# Patient Record
Sex: FEMALE | Race: WHITE | HISPANIC OR LATINO | Employment: FULL TIME | URBAN - METROPOLITAN AREA
[De-identification: names, ages, dates, MRNs, and addresses within clinical notes are randomized per-mention and may not be internally consistent; named-entity substitution may affect disease eponyms.]

---

## 2023-01-10 ENCOUNTER — OFFICE VISIT (OUTPATIENT)
Dept: FAMILY MEDICINE CLINIC | Facility: CLINIC | Age: 23
End: 2023-01-10
Payer: COMMERCIAL

## 2023-01-10 ENCOUNTER — LAB (OUTPATIENT)
Dept: LAB | Facility: HOSPITAL | Age: 23
End: 2023-01-10
Payer: COMMERCIAL

## 2023-01-10 ENCOUNTER — PATIENT ROUNDING (BHMG ONLY) (OUTPATIENT)
Dept: FAMILY MEDICINE CLINIC | Facility: CLINIC | Age: 23
End: 2023-01-10
Payer: COMMERCIAL

## 2023-01-10 VITALS
BODY MASS INDEX: 20.73 KG/M2 | DIASTOLIC BLOOD PRESSURE: 70 MMHG | WEIGHT: 129 LBS | OXYGEN SATURATION: 99 % | HEIGHT: 66 IN | HEART RATE: 79 BPM | SYSTOLIC BLOOD PRESSURE: 112 MMHG

## 2023-01-10 DIAGNOSIS — R10.11 RUQ PAIN: ICD-10-CM

## 2023-01-10 DIAGNOSIS — R31.9 HEMATURIA, UNSPECIFIED TYPE: ICD-10-CM

## 2023-01-10 DIAGNOSIS — R19.7 DIARRHEA, UNSPECIFIED TYPE: ICD-10-CM

## 2023-01-10 DIAGNOSIS — R19.7 DIARRHEA, UNSPECIFIED TYPE: Primary | ICD-10-CM

## 2023-01-10 PROCEDURE — 80053 COMPREHEN METABOLIC PANEL: CPT

## 2023-01-10 PROCEDURE — 99204 OFFICE O/P NEW MOD 45 MIN: CPT | Performed by: FAMILY MEDICINE

## 2023-01-10 PROCEDURE — 85027 COMPLETE CBC AUTOMATED: CPT

## 2023-01-10 PROCEDURE — 82784 ASSAY IGA/IGD/IGG/IGM EACH: CPT

## 2023-01-10 PROCEDURE — 86231 EMA EACH IG CLASS: CPT

## 2023-01-10 PROCEDURE — 86364 TISS TRNSGLTMNASE EA IG CLAS: CPT

## 2023-01-10 NOTE — PROGRESS NOTES
Chief Complaint  New patient (Abdominal pain has been going on for a while now and getting worse, come on after eating /Pain close to kidney usually on right side) and Abdominal Pain    Subjective        Lorenza Mccloud presents to [facility]  Abdominal Pain  This is a new problem. The current episode started more than 1 year ago ( 2 years). The pain is located in the RUQ. The pain is mild. The quality of the pain is dull and aching. The abdominal pain radiates to the RUQ. Associated symptoms include belching, constipation and diarrhea. Pertinent negatives include no flatus, nausea or vomiting. The pain is aggravated by eating.     Knot in stomach. Keeps her up at night. BM once every 2 days. Firm stools not difficult to pass. Not lactose intolerant but if she eats something spicy she has diarrhea.   No heartburn but she has burping with spicy food. No FH of GI problems.      She did DNA testing and indicated prone to celiac disease. She eats pasta and things with gluten she has more of an effect.     She had UTI in October and had hematuria. Treated with medication. She has UTI 2 times in a year. She wonders about her kidneys. Bladder doesn't tell her she has to go but rather she gets ache on the right side and then she urinates. She has appt with gynecology.    She is tyring to increase fiber.       Objective   Vital Signs:  /70   Pulse 79   Ht 166.4 cm (65.5\")   Wt 58.5 kg (129 lb)   SpO2 99%   BMI 21.14 kg/m²   Estimated body mass index is 21.14 kg/m² as calculated from the following:    Height as of this encounter: 166.4 cm (65.5\").    Weight as of this encounter: 58.5 kg (129 lb).       BMI is within normal parameters. No other follow-up for BMI required.      Physical Exam  Vitals reviewed.   Constitutional:       General: She is not in acute distress.     Appearance: She is not ill-appearing.   Cardiovascular:      Rate and Rhythm: Normal rate and regular  Received call from patient who is requesting to speak to Samaria Shona. He states that he wants to get a chemical dependency assessment - wondering if Samaria can do that. He states he's getting too sick and can't keep doing this. Patient is having work done on his teeth and wants to wait until after that is done though. Please call. Phone #[phone]        Arcelia Ellison    [facility]      rhythm.   Pulmonary:      Effort: Pulmonary effort is normal.      Breath sounds: Normal breath sounds.   Abdominal:      General: Bowel sounds are normal. There is distension.      Palpations: There is no hepatomegaly or splenomegaly.      Tenderness: There is no abdominal tenderness. There is no guarding or rebound.   Genitourinary:     Comments: No CVA or suprapubic tenderness  Neurological:      Mental Status: She is alert.        Result Review :                   Assessment and Plan   Diagnoses and all orders for this visit:    1. Diarrhea, unspecified type (Primary)  -     Celiac Disease Panel; Future  -     CBC (No Diff); Future  -     US Abdomen Complete; Future    2. RUQ pain  -     Comprehensive Metabolic Panel; Future  -     US Abdomen Complete; Future    3. Hematuria, unspecified type  -     US Abdomen Complete; Future    Further evaluation of chronic abdominal pain and bowel changes with labs and ultrasound.  I provided her with nutrition counseling low FODMAP diet.  Along with her digestive problems she also has concerns about UTIs and bladder dysfunction.  Labs with renal function as well as imaging with ultrasound.    Follow-up once results are available.         Follow Up   Return if symptoms worsen or fail to improve.  Patient was given instructions and counseling regarding her condition or for health maintenance advice. Please see specific information pulled into the AVS if appropriate.     Electronically signed by Komal Sullivan MD, 01/10/23, 3:41 PM EST.

## 2023-01-11 LAB
ALBUMIN SERPL-MCNC: 4.9 G/DL (ref 3.5–5.2)
ALBUMIN/GLOB SERPL: 2.1 G/DL
ALP SERPL-CCNC: 66 U/L (ref 39–117)
ALT SERPL W P-5'-P-CCNC: 10 U/L (ref 1–33)
ANION GAP SERPL CALCULATED.3IONS-SCNC: 8.3 MMOL/L (ref 5–15)
AST SERPL-CCNC: 13 U/L (ref 1–32)
BILIRUB SERPL-MCNC: 0.5 MG/DL (ref 0–1.2)
BUN SERPL-MCNC: 9 MG/DL (ref 6–20)
BUN/CREAT SERPL: 14.5 (ref 7–25)
CALCIUM SPEC-SCNC: 9.7 MG/DL (ref 8.6–10.5)
CHLORIDE SERPL-SCNC: 103 MMOL/L (ref 98–107)
CO2 SERPL-SCNC: 28.7 MMOL/L (ref 22–29)
CREAT SERPL-MCNC: 0.62 MG/DL (ref 0.57–1)
DEPRECATED RDW RBC AUTO: 39.8 FL (ref 37–54)
EGFRCR SERPLBLD CKD-EPI 2021: 127.7 ML/MIN/1.73
ERYTHROCYTE [DISTWIDTH] IN BLOOD BY AUTOMATED COUNT: 12.1 % (ref 12.3–15.4)
GLOBULIN UR ELPH-MCNC: 2.3 GM/DL
GLUCOSE SERPL-MCNC: 99 MG/DL (ref 65–99)
HCT VFR BLD AUTO: 40.4 % (ref 34–46.6)
HGB BLD-MCNC: 13.6 G/DL (ref 12–15.9)
MCH RBC QN AUTO: 30 PG (ref 26.6–33)
MCHC RBC AUTO-ENTMCNC: 33.7 G/DL (ref 31.5–35.7)
MCV RBC AUTO: 89.2 FL (ref 79–97)
PLATELET # BLD AUTO: 343 10*3/MM3 (ref 140–450)
PMV BLD AUTO: 9.5 FL (ref 6–12)
POTASSIUM SERPL-SCNC: 3.9 MMOL/L (ref 3.5–5.2)
PROT SERPL-MCNC: 7.2 G/DL (ref 6–8.5)
RBC # BLD AUTO: 4.53 10*6/MM3 (ref 3.77–5.28)
SODIUM SERPL-SCNC: 140 MMOL/L (ref 136–145)
WBC NRBC COR # BLD: 6.78 10*3/MM3 (ref 3.4–10.8)

## 2023-01-12 LAB
ENDOMYSIUM IGA SER QL: NEGATIVE
IGA SERPL-MCNC: 156 MG/DL (ref 87–352)
TTG IGA SER-ACNC: <2 U/ML (ref 0–3)

## 2023-02-23 ENCOUNTER — OFFICE VISIT (OUTPATIENT)
Dept: FAMILY MEDICINE CLINIC | Facility: CLINIC | Age: 23
End: 2023-02-23
Payer: COMMERCIAL

## 2023-02-23 VITALS
WEIGHT: 128.2 LBS | OXYGEN SATURATION: 98 % | DIASTOLIC BLOOD PRESSURE: 62 MMHG | HEART RATE: 79 BPM | BODY MASS INDEX: 20.6 KG/M2 | HEIGHT: 66 IN | SYSTOLIC BLOOD PRESSURE: 110 MMHG

## 2023-02-23 DIAGNOSIS — Z12.4 SCREENING FOR CERVICAL CANCER: ICD-10-CM

## 2023-02-23 DIAGNOSIS — Z23 NEED FOR VACCINATION: ICD-10-CM

## 2023-02-23 DIAGNOSIS — Z00.00 WELL ADULT EXAM: Primary | ICD-10-CM

## 2023-02-23 PROCEDURE — 90471 IMMUNIZATION ADMIN: CPT | Performed by: FAMILY MEDICINE

## 2023-02-23 PROCEDURE — 99395 PREV VISIT EST AGE 18-39: CPT | Performed by: FAMILY MEDICINE

## 2023-02-23 PROCEDURE — 90715 TDAP VACCINE 7 YRS/> IM: CPT | Performed by: FAMILY MEDICINE

## 2023-02-23 RX ORDER — ESCITALOPRAM OXALATE 10 MG/1
1 TABLET ORAL DAILY
COMMUNITY
Start: 2023-02-22

## 2023-02-23 NOTE — PROGRESS NOTES
"Chief Complaint  Well adult exam (Started seeing Psych for anxiety  ) and Annual Exam    Subjective          Lorenza Mccloud presents to [facility] for   History of Present Illness  Answers for HPI/ROS submitted by the patient on 2/17/2023  Please describe your symptoms.: I don’t have any symptoms.  Have you had these symptoms before?: No  How long have you been having these symptoms?: 1-4 days  What is the primary reason for your visit?: Other    She is having cramps. Menses usually at the end of the month. Every 4 weeks. Heavy flow. Changes tampon every 2 hours on 2nd day of period, then lighter.  She feels tired. Back pain and mood changes as well. Using condoms. She is not planning on birth control at this time. No h/o abnormal pap smears.     She is no longer having stomach pain. She cut out bread and milk. She takes pre/probiotic. Increased fiber in diet.      She hasn't had COVID. She has had 3 COVID vaccines.         Review of Systems   Gastrointestinal: Negative for abdominal pain, constipation, diarrhea and GERD.   Genitourinary: Negative for dysuria, pelvic pain and vaginal discharge.   Musculoskeletal: Positive for back pain.         Objective   Vital Signs:   Vitals:    02/23/23 0813   BP: 110/62   Pulse: 79   SpO2: 98%   Weight: 58.2 kg (128 lb 3.2 oz)   Height: 166.4 cm (65.51\")     Body mass index is 21 kg/m².    BMI is within normal parameters. No other follow-up for BMI required.          Physical Exam  Vitals reviewed. Exam conducted with a chaperone present.   Constitutional:       General: She is not in acute distress.     Appearance: She is not ill-appearing.   HENT:      Right Ear: Tympanic membrane and ear canal normal.      Left Ear: Tympanic membrane and ear canal normal.   Eyes:      General:         Right eye: No discharge.         Left eye: No discharge.      Conjunctiva/sclera: Conjunctivae normal.   Neck:      Thyroid: No thyromegaly. "   Cardiovascular:      Rate and Rhythm: Normal rate and regular rhythm.   Pulmonary:      Effort: Pulmonary effort is normal. No respiratory distress.      Breath sounds: Normal breath sounds.   Chest:   Breasts:     Right: Normal. No mass, nipple discharge, skin change or tenderness.      Left: Normal. No mass, nipple discharge, skin change or tenderness.   Abdominal:      Palpations: Abdomen is soft. There is no hepatomegaly.      Tenderness: There is no abdominal tenderness.   Genitourinary:     General: Normal vulva.      Exam position: Lithotomy position.      Pubic Area: No rash.       Labia:         Right: No lesion.         Left: No lesion.       Urethra: No urethral lesion.      Vagina: Normal.      Cervix: Normal.      Uterus: Normal.       Adnexa:         Right: No mass or tenderness.          Left: No mass or tenderness.        Rectum: No external hemorrhoid.      Comments: Normal external genitalia    Musculoskeletal:      Cervical back: Neck supple.      Right lower leg: No edema.      Left lower leg: No edema.   Lymphadenopathy:      Head:      Right side of head: No submandibular, preauricular or posterior auricular adenopathy.      Left side of head: No submandibular, preauricular or posterior auricular adenopathy.      Cervical: No cervical adenopathy.      Right cervical: No superficial cervical adenopathy.     Left cervical: No superficial cervical adenopathy.      Upper Body:      Right upper body: No supraclavicular or axillary adenopathy.      Left upper body: No supraclavicular or axillary adenopathy.   Skin:     General: Skin is warm.      Findings: No rash.   Neurological:      Mental Status: She is alert and oriented to person, place, and time.      Gait: Gait normal.   Psychiatric:         Mood and Affect: Mood normal.         Behavior: Behavior normal.         Thought Content: Thought content normal.         Judgment: Judgment normal.        Result Review :   The following data was  reviewed by: Komal Sullivan MD on 02/23/2023:  Common labs    Common Labs 1/10/23 1/10/23    1516 1516   Glucose 99    BUN 9    Creatinine 0.62    Sodium 140    Potassium 3.9    Chloride 103    Calcium 9.7    Albumin 4.9    Total Bilirubin 0.5    Alkaline Phosphatase 66    AST (SGOT) 13    ALT (SGPT) 10    WBC  6.78   Hemoglobin  13.6   Hematocrit  40.4   Platelets  343                    Immunization History   Administered Date(s) Administered   • COVID-19 (PFIZER) PURPLE CAP 03/10/2021, 04/07/2021   • DTaP, Unspecified 09/20/2002   • HPV Quadrivalent 09/15/2009, 11/17/2009, 04/13/2010   • Hep B, Adolescent or Pediatric 09/07/2001   • IPV 09/20/2002   • MMR 09/20/2002   • Meningococcal Conjugate 11/17/2009   • Tdap 09/15/2009, 02/23/2023       Health Maintenance   Topic Date Due   • COVID-19 Vaccine (3 - Booster for Pfizer series) 06/02/2021   • INFLUENZA VACCINE  Never done   • HEPATITIS C SCREENING  Never done   • CHLAMYDIA SCREENING  Never done   • PAP SMEAR  Never done   • ANNUAL PHYSICAL  02/23/2024   • TDAP/TD VACCINES (3 - Td or Tdap) 02/23/2033   • HPV VACCINES  Completed   • Pneumococcal Vaccine 0-64  Aged Out            Assessment and Plan    Diagnoses and all orders for this visit:    1. Well adult exam (Primary)  She declined STI screening.  2. Screening for cervical cancer  -     LIQUID-BASED PAP SMEAR, [facility] ([facility]); Future  If normal plan to repeat again in 3 years  3. Need for vaccination  -     Tdap Vaccine Greater Than or Equal To 6yo IM  Recommend COVID bivalent booster as well.      Counseling/anticipatory guidance: Nutrition,  immunizations, screenings      Follow Up   Return in about 1 year (around 2/23/2024) for Physical.  Patient was given instructions and counseling regarding her condition or for health maintenance advice. Please see specific information pulled into the AVS if appropriate.      Electronically signed by Komal Sullivan MD, 02/23/23, 8:38 AM EST.

## 2023-02-27 LAB — REF LAB TEST METHOD: NORMAL

## 2023-10-11 ENCOUNTER — E-VISIT (OUTPATIENT)
Dept: FAMILY MEDICINE CLINIC | Facility: TELEHEALTH | Age: 23
End: 2023-10-11
Payer: COMMERCIAL

## 2023-10-11 PROCEDURE — BRIGHTMDVISIT: Performed by: NURSE PRACTITIONER

## 2023-10-12 RX ORDER — ESCITALOPRAM OXALATE 10 MG/1
10 TABLET ORAL DAILY
Qty: 90 TABLET | Refills: 0 | Status: SHIPPED | OUTPATIENT
Start: 2023-10-12

## 2023-10-12 NOTE — E-VISIT TREATED
Chief Complaint: Anxiety, Depression, Stress   Patient introduction   Patient is 23-year-old female presenting with mood symptoms. Patient has had current symptoms for more than a year. Has had recent unusual stress relating to personal relationships, home situation, family functioning, work, and finances.   Patient-submitted comments explaining reason for visit: I am wanting to be prescribed escitalopram again due to my anxiety. I had to stop seeing my provider due to losing insurance coverage under my parent. Now that I have insurance again I am just needing medication without seeing a psychiatrist. Lexapro worked for my anxiety and I would like to restart.   Patient is willing to try medication as part of their treatment plan.   Patient did not request an excuse note.   Depression screening   PHQ-9. Response options are: Not at all (0), On several days (1), More than half the days (2), or Nearly every day (3).   Over the past 2 weeks, patient has been bothered:    (1) On several days by having little interest or pleasure in doing things    (1) On several days by depressed mood    (1) On several days by sleep disturbance    (1) On several days by fatigue or lethargy    (1) On several days by change in appetite    (1) On several days by feelings of worthlessness or excessive guilt    (0) Not at all by poor concentration    (1) On several days by observable restlessness or slowness in movement    (0) Not at all by thoughts of hurting themselves or that they would be better off dead   The above problems have made it very difficult to work, function at home, or get along with other people.   Score: 7. Interpretation: 0 to 4: None to minimal. 5 to 9: Mild depression. 10 to 14: Major Depressive Disorder, Mild. 15 to 19: Major Depressive Disorder, Moderately Severe. 20 to 27: Major Depressive Disorder, Severe.   Anxiety screening   GILMA-7. Response options are: Not at all (0), On several days (1), More than half the  days (2), or Nearly every day (3)   Over the past 2 weeks, patient has been bothered:    (3) Nearly every day by feeling nervous, anxious, or on edge    (2) On more than half the days by not being able to stop or control worrying    (2) On more than half the days by worrying too much about different things    (1) On several days by having trouble relaxing    (1) On several days by being so restless that it is hard to sit still    (3) Nearly every day by becoming easily annoyed or irritable    (3) Nearly every day by feeling afraid, as if something awful might happen   The above problems have made it very difficult to work, function at home, or get along with other people.   Score: 15. Interpretation: 0 to 4: None to minimal. 5 to 9: Mild anxiety. 10 to 14: Moderate anxiety. 15 to 21: Severe anxiety.   Suicide risk screening   Score: Negative screen (based on PHQ-9 responses above).   Action taken based on risk:    Negative screen: Patient completed interview.    Low risk: Patient completed interview. Follow-up per provider discretion.    Moderate risk: Recommended to call [phone] or [phone] or to go to their nearest ER. Patient given option to continue with the interview if those options are not relevant at this time. Follow-up per provider discretion.    High risk: Interview terminated. Recommended to go to ER or to call [phone] or [phone].   Repetitive thoughts and behaviors screening   DSM-5 Level 1 Cross-Cutting Symptom Measure, Section X. 2 items. Response options are: Not at all (0), Rarely (1), Several days (2), More than half the days (3), or Nearly every day (4)   Over the past 2 weeks, patient has been bothered:    (1) On 1 to 2 days by unpleasant thoughts, urges, or images that repeatedly enter their mind    (0) Not at all by feeling driven to repeat certain behaviors or mental acts   Score: 1. Interpretation: 0 to 2 (with 0 to 1 on both items): Negative screen. 2 or higher (with 2 or higher on either item): Positive  screen.   Jen/hypomania screening   DSM-5 Level 1 Cross-Cutting Symptom Measure, Section III. 2 items. Response options are: Not at all (0), Rarely (1), Several days (2), More than half the days (3), or Nearly every day (4)   Over the past 2 weeks, patient has been bothered:    (2) On several days by sleeping less than usual, but still having a lot of energy    (2) On several days by starting lots more projects than usual or doing more risky things than usual   Score: 4. Interpretation: 0 to 2 (with 1 on both items): Negative screen. 2 or higher (with 2 or higher on at least 1 item): Positive screen; in-interview follow-up with Marysol Self-Rating Jen (ASRM) Scale.   Mary[place] Self-Rating Jen (ASRM) Scale. 5 items in which patient chooses the statement that best describes how they have been feeling over the past week.   Patient responses:    (1) I occasionally feel happier or more cheerful than usual    (1) I occasionally feel more self-confident than usual    (0) I do not need less sleep than usual    (1) I occasionally talk more than usual    (1) I have occasionally been more active than usual   Score: 4. Interpretation: A score of 6 or higher indicates a high probability of a manic or hypomanic condition and may indicate a need for treatment and/or further diagnostic workup. A score of 5 or lower is less likely to be associated with significant symptoms of jen.   Psychosis/hallucination screening   DSM-5 Level 1 Cross-Cutting Symptom Measure, Section VII. 2 items. Response options are: Not at all (0), Rarely (1), Several days (2), More than half the days (3), or Nearly every day (4)   Over the past 2 weeks, patient has been bothered:    (0) Not at all by hearing things other people could not hear    (0) Not at all by feeling that someone could hear their thoughts   Score: 0. Interpretation: 0: Negative screen. 1 or higher: Positive screen.   Substance abuse screening   DSM-5 Level 1 Cross-Cutting Symptom  Measure, Section XIII. 2 items on use of tobacco, recreational drugs, or prescription medications beyond the amount prescribed or duration of prescription.   Over the past 2 weeks, patient:    (0) Did not use tobacco    (0) Did not use a recreational or prescription drug on their own   Score: 0. Interpretation: 0 is a negative screen. 1 or higher with positive response for prescription/recreational drug abuse leads to follow-up with Level 2 Cross-Cutting Symptom Measure, Section XIII. 1 or higher with positive response for tobacco use leads to tobacco cessation advice in AVS.   AUDIT-C. 3 items, shown if alcohol use reported above.   During the past year, patient:    (2) Had an alcoholic drink 2 to 4 times per month    (0) Had 1 to 2 alcoholic drinks on a typical day when they were drinking    (0) Did not have 6 or more drinks on one occasion   Score: 2. Interpretation: A score of 3 or higher in women is a positive screen for unhealthy drinking.   Comorbid/Exacerbating conditions   No history of asthma, cancer, chronic pain, congestive heart failure, coronary artery disease, diabetes, epilepsy, hypertension, inflammatory arthritis, kidney disease or history of kindey function problems, lupus, multiple sclerosis, Parkinson disease, thyroid disorder, or viral hepatitis.   Past mental health history   Previous diagnosis of depression and generalized anxiety disorder. Regarding month and year of first depression diagnosis, patient writes: 02/01/2023. Since initial depression diagnosis, patient has had a period when symptoms resolved and they did not need medication.   Family history of mental health disorders   No known family history of depression, generalized anxiety disorder, panic attacks, PTSD, OCD, bipolar disorder, schizophrenia/schizoaffective disorder, substance use disorder, or suicide/suicide attempt.   Current mental health treatment   Patient is not currently taking medication for any mental health  condition. Patient is not currently in counseling or therapy.   Previous mental health treatment   Patient has been seen by a psychiatrist in the past 2 years.   Has taken escitalopram and sertraline in the past.   As to effectiveness of past treatment:   Patient was satisfied with escitalopram. Patient wants to refill escitalopram.   Patient was not satisfied with sertraline. They felt it caused bothersome side effects. They had sexual side effects and weight gain. Patient does not want to refill sertraline.   Vital signs    Height: 157 centimeters    Weight: 58.9 kilograms   Current medications   Not taking other medications or supplements.   Medication allergies   None.   Medication contraindications   None.   Assessment   Mild depression.   This diagnosis is based on review of patient interview responses and other available clinical information.    PHQ-9 depression screening score: 7. Interpretation: 5 to 9: Mild depression.    GILMA-7 generalized anxiety screening score: 15. Interpretation: 15 to 21: Severe anxiety.   Suicide risk severity screening was negative.   Plan   Medications:   No medications prescribed.   Orders:    Referral to behavioral health. Additional note: Urgent Refer to [facility] for [facility] for medication management   Education:    Condition and causes    Treatment and self-care    Possible medication side effects    When to call provider   ----------   Electronically signed by EMELINA Falk on 2023-10-11 at 22:55PM   ----------   Patient Interview Transcript:   Have you ever been diagnosed with any of these mental health conditions? Select all that apply.    Depression    Generalized anxiety disorder (GILMA)   Not selected:    Panic attacks    Post traumatic stress disorder (PTSD)    Obsessive-compulsive disorder (OCD)    Bipolar disorder    Schizophrenia or schizoaffective disorder    A mental health condition not listed here (specify)    None of the above    When were you first diagnosed with depression? Please specify the month and year, or your best estimate, as MM/YYYY.    02/01/2023   Since you were first diagnosed with depression, has there been a time when your symptoms went away completely and you didn't need to take medication? Select one.    Yes   Not selected:    No   Are you currently taking medication for any mental health condition? Select one.    No   Not selected:    Yes   Have you taken medication for any mental health condition in the past? Select one.    Yes   Not selected:    No   Which medications have you taken in the past for your mental health condition(s)? Select all that apply.    Lexapro (escitalopram)    Zoloft (sertraline)   Not selected:    Atarax or Vistaril (hydroxyzine)    BuSpar (buspirone)    Celexa (citalopram)    Cymbalta or Drizalma Sprinkle (duloxetine)    Effexor or Effexor XR (venlafaxine)    Paxil, Paxil CR, or Pexeva (paroxetine)    Pristiq (desvenlafaxine)    Prozac (fluoxetine)    Remeron (mirtazapine)    Trazodone    Wellbutrin SR, Wellbutrin XL, Forfivo XL, or Aplenzin (bupropion)    Other (specify medication and whether you were satisfied with it)   I'm satisfied with Zoloft (sertraline)    No   Not selected:    Yes   I want to refill/restart    No   Not selected:    Yes   Why were you unsatisfied with Zoloft (sertraline)? Select all that apply.    I don't like the side effects   Not selected:    It doesn't help my symptoms at all    It helps some, but I still have bothersome symptoms    It's too expensive    None of the above   Have you had any of these side effects when taking Zoloft (sertraline)? Select all that apply.    Sexual side effects    Weight gain   Not selected:    Drowsiness    Trouble sleeping    Headache    Anxiety    Dizziness    Diarrhea    None of the above   I'm satisfied with Lexapro (escitalopram)    Yes   Not selected:    No   I want to refill/restart    Yes   Not selected:    No   Have you recently  experienced unusual stress from any of these? Select all that apply.    Personal relationships    Home situation    Family    Work    Finances   Not selected:    Something related to COVID-19    Current news and events    None of the above   Are you currently in counseling or therapy? Select one.    No   Not selected:    Yes   Are you currently being seen by a psychiatrist, or have you been seen by a psychiatrist in the last 2 years? Select one.    Yes, within the last 2 years   Not selected:    Yes, currently    No   Do any of these apply to you? Select all that apply.    None of the above   Not selected:    I'm pregnant    I've given birth in the past 12 months    I'm breastfeeding   Do you have any of these medical conditions? Scroll to see all options. Select all that apply.    None of the above   Not selected:    Asthma    Cancer    Chronic pain    Congestive heart failure    Coronary artery disease (blocked arteries in the heart)    Diabetes    Epilepsy    High blood pressure    Inflammatory arthritis    Kidney disease or history of kidney function problems    Lupus (SLE)    Multiple sclerosis    Parkinson disease    Thyroid disorder    Viral hepatitis   Do any of these apply to your first-degree blood relatives? First-degree blood relatives include parents, siblings, and children who you're related to by birth, not by marriage or adoption. Select all that apply.    No, not that I know of   Not selected:    Depression    Generalized anxiety disorder (GILMA)    Panic attacks    Post traumatic stress disorder (PTSD)    Obsessive-compulsive disorder (OCD)    Bipolar disorder    Schizophrenia or schizoaffective disorder    Drug or alcohol addiction (substance use disorder)     by suicide    Attempted suicide   1. Over the past 2 weeks, how often have you been bothered by: Having little interest or pleasure in doing things Select one.    Several days   Not selected:    Not at all    More than half the days     Nearly every day   2. Over the past 2 weeks, how often have you been bothered by: Feeling down, depressed, or hopeless Select one.    Several days   Not selected:    Not at all    More than half the days    Nearly every day   3. Over the past 2 weeks, how often have you been bothered by: Trouble falling or staying asleep, or sleeping too much Select one.    Several days   Not selected:    Not at all    More than half the days    Nearly every day   4. Over the past 2 weeks, how often have you been bothered by: Feeling tired or having little energy Select one.    Several days   Not selected:    Not at all    More than half the days    Nearly every day   5. Over the past 2 weeks, how often have you been bothered by: Poor appetite or overeating Select one.    Several days   Not selected:    Not at all    More than half the days    Nearly every day   6. Over the past 2 weeks, how often have you been bothered by: Feeling bad about yourself, that you're a failure, or that you've let yourself or friends and family down Select one.    Several days   Not selected:    Not at all    More than half the days    Nearly every day   7. Over the past 2 weeks, how often have you been bothered by: Trouble concentrating on things like watching TV or reading the news Select one.    Not at all   Not selected:    Several days    More than half the days    Nearly every day   8. Over the past 2 weeks, how often have you been bothered by: Moving or speaking so slowly that other people could have noticed OR Being so fidgety or restless that you have been moving around a lot more than usual Select one.    Several days   Not selected:    Not at all    More than half the days    Nearly every day   9. Over the past 2 weeks, how often have you been bothered by: Thoughts that you'd be better off dead or thoughts of hurting yourself Select one.    Not at all   Not selected:    Several days    More than half the days    Nearly every day   How  difficult have these problems made it for you to work, take care of things at home, or get along with other people? Select one.    Very difficult   Not selected:    Not difficult at all    Somewhat difficult    Extremely difficult   1. Over the past 2 weeks, how often have you been bothered by: Feeling nervous, anxious, or on edge? Select one.    Nearly every day   Not selected:    Not at all    Several days    More than half the days   2. Over the past 2 weeks, how often have you been bothered by: Not being able to stop or control worrying? Select one.    More than half the days   Not selected:    Not at all    Several days    Nearly every day   3. Over the past 2 weeks, how often have you been bothered by: Worrying too much about different things? Select one.    More than half the days   Not selected:    Not at all    Several days    Nearly every day   4. Over the past 2 weeks, how often have you been bothered by: Having trouble relaxing? Select one.    Several days   Not selected:    Not at all    More than half the days    Nearly every day   5. Over the past 2 weeks, how often have you been bothered by: Being so restless that it's hard to sit still? Select one.    Several days   Not selected:    Not at all    More than half the days    Nearly every day   6. Over the past 2 weeks, how often have you been bothered by: Becoming easily annoyed or irritable? Select one.    Nearly every day   Not selected:    Not at all    Several days    More than half the days   7. Over the past 2 weeks, how often have you been bothered by: Feeling afraid, as if something awful might happen? Select one.    Nearly every day   Not selected:    Not at all    Several days    More than half the days   How difficult have these symptoms made it for you to do your work, take care of things at home, or get along with other people? Select one.    Very difficult   Not selected:    Not difficult at all    Somewhat difficult    Extremely  "difficult   Over the past 2 weeks, how often have you been bothered by: Sleeping less than usual, but still having a lot of energy? Select one.    Several days   Not selected:    Not at all    1 to 2 days    More than half the days    Nearly every day   Over the past 2 weeks, how often have you been bothered by: Starting lots more projects than usual or doing more risky things than usual? Select one.    Several days   Not selected:    Not at all    1 to 2 days    More than half the days    Nearly every day   Which statement best describes how you've been feeling over the past week? \"Occasionally\" here means once or twice, and \"often\" means several times or more. Select one.    I occasionally feel happier or more cheerful than usual   Not selected:    I don't feel happier or more cheerful than usual    I often feel happier or more cheerful than usual    I feel happier or more cheerful than usual most of the time    I feel happier or more cheerful than usual all of the time   Which statement best describes how you've been feeling over the past week? \"Occasionally\" here means once or twice, and \"often\" means several times or more. Select one.    I occasionally feel more self-confident than usual   Not selected:    I don't feel more self-confident than usual    I often feel more self-confident than usual    I feel more self-confident than usual most of the time    I feel extremely self-confident all of the time   Which statement best describes how you've been feeling over the past week? \"Occasionally\" here means once or twice, and \"often\" means several times or more. Select one.    I don't need less sleep than usual   Not selected:    I occasionally need less sleep than usual    I often need less sleep than usual    I need less sleep than usual most of the time    I can go all day and all night without any sleep and still not feel tired   Which statement best describes how you've been feeling over the past week? " "\"Occasionally\" here means once or twice, and \"often\" means several times or more. Select one.    I occasionally talk more than usual   Not selected:    I don't talk more than usual    I often talk more than usual    I talk more than usual most of the time    I talk constantly and can't be interrupted   Which statement best describes how you've been feeling over the past week? \"Occasionally\" here means once or twice, and \"often\" means several times or more. By \"active,\" we mean socially, sexually, or at work, home, or school. Select one.    I have occasionally been more active than usual   Not selected:    I haven't been more active than usual    I have often been more active than usual    I have been more active than usual most of the time    I am constantly active or on the go all the time   Over the past 2 weeks, how often have you been bothered by: Hearing things other people couldn't hear, such as voices even when no one was around? Select one.    Not at all   Not selected:    1 to 2 days    Several days    More than half the days    Nearly every day   Over the past 2 weeks, how often have you been bothered by: Feeling that someone could hear your thoughts, or that you could hear what another person was thinking? Select one.    Not at all   Not selected:    1 to 2 days    Several days    More than half the days    Nearly every day   Over the past 2 weeks, how often have you been bothered by: Unpleasant thoughts, urges, or images that repeatedly enter your mind? Select one.    1 to 2 days   Not selected:    Not at all    Several days    More than half the days    Nearly every day   Over the past 2 weeks, how often have you been bothered by: Feeling driven to perform certain behaviors or mental acts over and over again? Select one.    Not at all   Not selected:    1 to 2 days    Several days    More than half the days    Nearly every day   In the past year, how often did you have a drink containing alcohol? Select " "one.    2 to 4 times per month   Not selected:    Never    Monthly or less    2 to 3 times per week    4 or more times per week   In the past year, how many drinks did you have on a typical day when you were drinking? Select one.    1 or 2   Not selected:    3 or 4    5 or 6    7 to 9    10 or more   In the past year, how often did you have 6 or more drinks on one occasion? Select one.    Never   Not selected:    Less than monthly    Monthly    Weekly    Daily or almost daily   Over the past 2 weeks, how often have you: Smoked any cigarettes, smoked a cigar or pipe, or used snuff or chewing tobacco? Select one.    Not at all   Not selected:    1 to 2 days    Several days    More than half the days    Nearly every day   Over the past 2 weeks, how often did you use any of these on your own? \"On your own\" means without a doctor's prescription, or more than prescribed, or longer than prescribed. - Prescription painkillers, such as Vicodin - Stimulants, such as Ritalin or Adderall - Sedatives or tranquilizers, such as sleeping pills or Valium - Marijuana - Cocaine or crack - Club drugs, such as Ecstasy - Hallucinogens, such as LSD - Heroin - Inhalants or solvents, such as glue - Methamphetamines, such as speed Select one.    Not at all   Not selected:    1 to 2 days    Several days    More than half the days    Nearly every day   Think about all of the symptoms you've shared with us today. How long have you been feeling this way? Select one.    More than a year   Not selected:    Less than a year    I'm not sure   These last few questions help us make sure your treatment plan is safe for you. Do you have any of these conditions? Select all that apply.    None of these   Not selected:    Uncorrected or persistent electrolyte abnormalities, such as potassium, sodium, calcium or magnesium    QT prolongation    Congenital long QT syndrome (LQTS)    Ventricular arrhythmias, such as ventricular fibrillation or ventricular " "tachycardia    Bradycardia (low heart rate)    Recent heart attack    Congestive heart failure (CHF)   Do any of these apply to you now or in the recent past? \"Cold turkey\" here means stopping a medication suddenly rather than slowly taking lower and lower doses until you're off the medication. Select all that apply.    None of these   Not selected:    Seizure disorder    Bulimia or anorexia    Liver disease    Alcohol abuse    Stopped using alcohol \"cold turkey\"    Stopped using a sedative \"cold turkey\"    Stopped using an anti-seizure drug \"cold turkey\"    Stopped using a benzodiazepine drug (Klonopin, Valium, Ativan, Xanax) \"cold turkey\"   Do any of the following apply to you? Select all that apply.    None of these   Not selected:    I'm currently taking pimozide    I'm currently taking thioridazine    I've taken an MAO inhibitor in the last 14 days    I've taken linezolid or IV methylene blue in the last 14 days   Are you taking any other medications, vitamins, or supplements? Select one.    No   Not selected:    Yes   Have you ever had an allergic or bad reaction to any medication? Select one.    No   Not selected:    Yes   If medication is recommended as part of your treatment plan, is that something you're willing to try? Select one.    Yes   Not selected:    No   Knowing your Body Mass Index (BMI) can help your provider choose the best medication for you. To determine your BMI, we need to know your height and weight. Enter your height.    Height   Enter your weight (in pounds).    Weight   Do you need a doctor's note? A doctor's note confirms that you received care today and states when you can return to school or work. It does not contain information about your diagnosis or treatment plan. Your provider will make the final decision on whether to give you a doctor's note. Doctor's notes CANNOT be backdated. Select one.    No   Not selected:    Today only (1 day)    Today and tomorrow (2 days)    3 days   " What is the main reason you're taking this interview today?    I am wanting to be prescribed escitalopram again due to my anxiety. I had to stop seeing my provider due to losing insurance coverage under my parent. Now that I have insurance again I am just needing medication without seeing a psychiatrist. Lexapro worked for my anxiety and I would like to restart   ----------   Medical history   Medical history data does not currently exist for this patient.

## 2023-10-12 NOTE — EXTERNAL PATIENT INSTRUCTIONS
Note   ER for any suicidal or homicidal ideation   Diagnosis   Depression   My name is EMELINA Falk. I'm a healthcare provider at [facility]. I've reviewed your interview, and I see that you have some common symptoms of depression. I'm glad you reached out.   Depression is the most common mental health condition worldwide. In the [place], about 1 in 5 people will experience clinical depression in their lifetime. Fortunately, effective treatments are available. The sooner you start treatment, the better it works.   Treatment for depression can include counseling, coaching, consultations, antidepressant medications, or various digital tools. In creating your treatment plan, I've considered your symptoms, current situation, medical history, and previous treatments, if any.    Please follow up with your provider in a few weeks. They'll check how you're doing and adjust your treatment plan if necessary.   In addition to your prescribed treatment, there are things you can do to help yourself feel better. Depression can lead you to avoid doing tasks, activities, and being with others. Taking action can help break the cycle of avoidance. Even small actions and lifestyle changes can make a big difference. Try some of the suggestions in the Other treatment section below.   Orders and referrals   I've included a referral for therapy in your treatment plan. Someone will contact you to schedule an appointment for counseling or therapy.   About your diagnosis   Depression is different from ordinary sadness. When you're sad or going through normal grief, the feelings may come and go, and then fade over time. Depression causes long-standing symptoms that affect your ability to go about your daily life.   It's a health condition that affects how you think and function, and can even affect your self-esteem. Sometimes depression can also cause physical symptoms such as headaches and stomach pains.   Common symptoms  of depression include:    Feeling sad, hopeless, discouraged, or down    Loss of interest or pleasure in previously enjoyable activities    Appetite or weight changes    Sleep disturbances: sleeping too much or too little    Either restlessness or sluggishness    Loss of energy    Excessive guilt    Feelings of worthlessness    Difficulty concentrating    Recurrent thoughts of death or suicide   What to expect   Counseling and talk therapy   Counseling or therapy teaches you new coping skills and more adaptive ways of thinking about problems. These tools can help you make positive changes. The benefits of counseling often last long after treatment sessions have stopped.   Many people with mild symptoms of depression don't need medication, and can be treated with counseling, coaching, or other types of care management.   When to seek care   If you feel like harming yourself or others, call 911 right away.   The [place] [facility] Crisis Lifeline is also available. You can call 988   to speak with a counselor at the lifeline, or you can connect with one using their online chat  .   Call us at [phone]   with any sudden or unexpected symptoms.    Worsening depression symptoms    New or worsening anxiety symptoms    Feeling extremely agitated or restless    Panic attacks    Worsening insomnia    New or worsening irritability    Inappropriate aggression, anger, or violence    Dangerous impulses    Extreme increase in activity or talking    Other unusual changes in behavior, mood, thoughts, or feeling   Other treatment   The tips below may help you feel better while you start your treatment plan:   Self-care    Depression can make self-care hard, but taking action can help you get better. So start where you are and set small goals. These can be simple: get out of bed, take a shower, get dressed, prepare a meal.    Make a list of activities that usually improve your mood. When you're feeling down, try doing one of  "those activities, even for a few minutes.    Be kind to yourself. Don't get down on yourself if you don't reach a goal. Be willing to try again.    Try to eat on a regular schedule. Blood sugar levels can affect mood.   Exercise    Physical exercise has an especially positive effect on mood. If you're able to, try walking 30 minutes a day, 3 to 5 times a week. If that sounds like too much, challenge yourself to start walking for just 10 minutes a day. If walking is not for you, find another activity. Any kind of physical activity helps. The best exercise is the kind you enjoy and will actually do.   Improve your sleep   Getting better sleep is one of the best things you can do to improve your symptoms.    Caffeine, tobacco, and alcohol can cause interrupted sleep. Cutting down or quitting these can improve the quality of your sleep. If you can't quit caffeine completely, try avoiding it later in the day.    Set a regular bedtime, and allow a period of time to \"unwind\" before going to sleep.    Wake up at the same time every day.    Turn off or put away all electronic devices an hour before going to sleep.    Avoid reading, watching TV, or using electronic devices in bed.    As much as possible, keep your bedroom dark, cool, and quiet.    If you're struggling to sleep, don't stay in bed. Get up and go to a quiet spot. Read or do relaxation exercises. Then go back to bed and try again.   Try mindfulness exercises    If your mind races, focus on your body instead. Breathe in slowly through your nose and out through your mouth.    Some people find that meditation helps with mood symptoms. If you want to try meditation but don't know how, mobile apps can get you started.   Use your creativity    When you have depression, you can often spend too much time thinking. Making something with your hands can use your thoughts in a positive way and bring some relief. It also helps you move from inaction to action. Activities like " writing in a journal, gardening, woodworking, cooking, or doing a craft can help focus your mind.   Connect with others    If you can't meet in person, send a short text or email to someone just to keep in touch.    If you use social media, notice how it makes you feel. If certain topics or people have a negative effect on your mood, unfollow them. Limit the time you spend on social media. Active participation can be better than passive scrolling through a feed.    If you're up to it, try volunteering. Or just do something kind for someone. This can lift your mood as well as theirs.   Your provider   Your diagnosis was provided by EMELINA Falk, a member of your trusted care team at [facility].   If you have any questions, call us at [phone]  .

## 2023-10-12 NOTE — TELEPHONE ENCOUNTER
Rx Refill Note  Requested Prescriptions     Pending Prescriptions Disp Refills    escitalopram (LEXAPRO) 10 MG tablet       Sig: Take 1 tablet by mouth Daily.      Last office visit with prescribing clinician: 2/23/2023   Last telemedicine visit with prescribing clinician: Visit date not found   Next office visit with prescribing clinician: Visit date not found                         Would you like a call back once the refill request has been completed: [] Yes [] No    If the office needs to give you a call back, can they leave a voicemail: [] Yes [] No    Eddie Campos MA  10/12/23, 09:13 EDT      Return in about 1 year (around 2/23/2024) for Physical.

## 2024-01-26 RX ORDER — ESCITALOPRAM OXALATE 10 MG/1
10 TABLET ORAL DAILY
Qty: 90 TABLET | Refills: 0 | Status: SHIPPED | OUTPATIENT
Start: 2024-01-26

## 2024-01-26 NOTE — TELEPHONE ENCOUNTER
Rx Refill Note  Requested Prescriptions     Pending Prescriptions Disp Refills    escitalopram (LEXAPRO) 10 MG tablet 90 tablet 0     Sig: Take 1 tablet by mouth Daily.      Last office visit with prescribing clinician: 2/23/2023   Last telemedicine visit with prescribing clinician: Visit date not found   Next office visit with prescribing clinician: Visit date not found     Osbaldo Sanches Rep  01/26/24, 07:46 EST    HUB TO RELAY  Rx sent for one month

## 2024-04-01 ENCOUNTER — OFFICE VISIT (OUTPATIENT)
Dept: FAMILY MEDICINE CLINIC | Facility: CLINIC | Age: 24
End: 2024-04-01
Payer: COMMERCIAL

## 2024-04-01 VITALS
HEIGHT: 66 IN | SYSTOLIC BLOOD PRESSURE: 118 MMHG | HEART RATE: 82 BPM | WEIGHT: 131 LBS | DIASTOLIC BLOOD PRESSURE: 62 MMHG | OXYGEN SATURATION: 98 % | BODY MASS INDEX: 21.05 KG/M2 | RESPIRATION RATE: 14 BRPM

## 2024-04-01 DIAGNOSIS — Z76.89 ENCOUNTER TO ESTABLISH CARE: ICD-10-CM

## 2024-04-01 DIAGNOSIS — Z00.00 PHYSICAL EXAM, ANNUAL: Primary | ICD-10-CM

## 2024-04-01 DIAGNOSIS — N94.6 MENSTRUAL CRAMPS: ICD-10-CM

## 2024-04-01 DIAGNOSIS — F41.9 ANXIETY: ICD-10-CM

## 2024-04-01 PROBLEM — F43.21 ADJUSTMENT DISORDER WITH DEPRESSED MOOD: Status: ACTIVE | Noted: 2024-04-01

## 2024-04-01 PROCEDURE — 99395 PREV VISIT EST AGE 18-39: CPT | Performed by: PHYSICIAN ASSISTANT

## 2024-04-01 RX ORDER — PRENATAL VIT NO.126/IRON/FOLIC 28MG-0.8MG
TABLET ORAL DAILY
COMMUNITY

## 2024-04-01 RX ORDER — ESCITALOPRAM OXALATE 10 MG/1
10 TABLET ORAL DAILY
Qty: 90 TABLET | Refills: 3 | Status: SHIPPED | OUTPATIENT
Start: 2024-04-01

## 2024-04-01 NOTE — PROGRESS NOTES
Chief Complaint   Patient presents with    [facility] [place] [facility]    Anxiety       Lorenza Vida Mccloud is a pleasant 23 y.o. female who is here for annual physical exam.  Patient was previously seen by another provider, Dr. Sullivan and is transferring care to me.  She is doing well overall.  She reports that her mood is stable on Lexapro 10 mg daily.  She does occasionally get sharp cramps in her lower right abdomen which she associates with her menstrual cycle.  She has never had an ultrasound.  She takes ibuprofen without much relief.  She is not established with an obstetrician or gynecologist.  She states that she plans on starting a family soon and would like a referral to an OB/GYN.  She is not on any birth control and does not want to start this because she is hoping to conceive soon.  She has a destination wedding planned in July in [place].  Going to ophthalmologist and dentist regularly.  Reviewed vaccinations.  No moles or lesions of concern.    Past Medical History:   Diagnosis Date    Anxiety        History reviewed. No pertinent surgical history.    History reviewed. No pertinent family history.    Social History     Socioeconomic History    Marital status:    Tobacco Use    Smoking status: Never    Smokeless tobacco: Never   Substance and Sexual Activity    Alcohol use: Never    Drug use: Never    Sexual activity: Yes     Partners: Male     Birth control/protection: Condom       No Known Allergies    ROS  Review of Systems   Constitutional:  Negative for chills, diaphoresis, fatigue and fever.   HENT:  Negative for congestion, ear pain, hearing loss, postnasal drip, rhinorrhea and sore throat.    Eyes:  Negative for blurred vision and pain.   Respiratory:  Negative for cough, shortness of breath and wheezing.    Cardiovascular:  Negative for chest pain and leg swelling.   Gastrointestinal:  Negative for abdominal pain, blood in stool, constipation, diarrhea, nausea, vomiting and  "indigestion.   Endocrine: Negative for polyuria.   Genitourinary:  Positive for pelvic pain. Negative for dysuria, flank pain and hematuria.   Musculoskeletal:  Negative for arthralgias, gait problem and myalgias.   Skin:  Negative for rash and skin lesions.   Neurological:  Negative for dizziness and headache.   Psychiatric/Behavioral:  Negative for self-injury, sleep disturbance, suicidal ideas, depressed mood and stress. The patient is not nervous/anxious.        Vitals:    24 1246   BP: 118/62   BP Location: Left arm   Patient Position: Sitting   Cuff Size: Adult   Pulse: 82   Resp: 14   SpO2: 98%   Weight: 59.4 kg (131 lb)   Height: 166.4 cm (65.51\")   PainSc: 0-No pain     Body mass index is 21.46 kg/m².    BMI is within normal parameters. No other follow-up for BMI required.       Current Outpatient Medications on File Prior to Visit   Medication Sig Dispense Refill    [DISCONTINUED] escitalopram (LEXAPRO) 10 MG tablet Take 1 tablet by mouth Daily. 90 tablet 0    prenatal vitamin (prenatal, CLASSIC, vitamin) tablet Take  by mouth Daily.       No current facility-administered medications on file prior to visit.       Results for orders placed or performed in visit on 23   LIQUID-BASED PAP SMEAR, [facility] ([facility])    Specimen: ThinPrep Vial   Result Value Ref Range    Reference Lab Report       [facility]  [address] [place] [address]  Phone: [phone] or [phone]  Fax: [phone]  Jamil Lopez M.D., Medical Director    PATIENT NAME                                     LABORATORY NO.  LIS CHOWDARY.                        [id number]  [id number]                                 AGE                    SEX   SSN              CLIENT REF #  [facility]             2000      F                      [id number]    RD                                         REQUESTING M.D.           ATTENDING M.D.       "   COPY TO.  [id number]                      [place] [address]                        DATE COLLECTED            DATE RECEIVED          DATE REPORTED  02/23/2023 02/24/2023 02/27/2023    ThinPrep Pap with [facility]    DIAGNOSIS:  Negative for intraepithelial lesion or malignancy    Multiple factors can influence accuracy of Pap  tests; therefore, screening at  regular intervals is necessary for early cancer detection.      SPECIMEN ADEQUACY:  SATISFACTORY FOR EVALUATION  Transformation zone is absent or insufficient.  Data is  conflicting on the significance of ECC/TZ elements, an  annual repeat Pap smear is suggested.  SOURCE OF SPECIMEN:       CERVICAL  SLIDES:  1  CLINICAL HISTORY:  Screening for cervical cancer      CYTOTECHNOLOGIST:             LOIS BRANDON (ASCP)    CPT CODES:  39855         PE    Physical Exam  Vitals reviewed.   Constitutional:       General: She is not in acute distress.     Appearance: Normal appearance. She is well-developed and normal weight. She is not ill-appearing or diaphoretic.   HENT:      Head: Normocephalic and atraumatic.      Right Ear: Hearing, tympanic membrane, ear canal and external ear normal.      Left Ear: Hearing, tympanic membrane, ear canal and external ear normal.      Nose: Nose normal.      Right Sinus: No maxillary sinus tenderness or frontal sinus tenderness.      Left Sinus: No maxillary sinus tenderness or frontal sinus tenderness.      Mouth/Throat:      Pharynx: Uvula midline.   Eyes:      General: Lids are normal.      Extraocular Movements: Extraocular movements intact.      Conjunctiva/sclera: Conjunctivae normal.   Neck:      Thyroid: No thyroid mass or thyromegaly.      Trachea: Trachea and phonation normal.   Cardiovascular:      Rate and Rhythm: Normal rate and regular rhythm.      Heart sounds: Normal heart sounds.   Pulmonary:      Effort: Pulmonary effort is normal.      Breath sounds: Normal  breath sounds.   Abdominal:      General: Bowel sounds are normal. There is no distension.      Palpations: Abdomen is soft. Abdomen is not rigid.      Tenderness: There is no abdominal tenderness. There is no guarding.   Musculoskeletal:         General: Normal range of motion.      Cervical back: Normal range of motion.      Right lower leg: No edema.      Left lower leg: No edema.   Lymphadenopathy:      Cervical: No cervical adenopathy.      Right cervical: No superficial cervical adenopathy.     Left cervical: No superficial cervical adenopathy.   Skin:     General: Skin is warm.      Findings: No erythema or rash.      Nails: There is no clubbing.   Neurological:      Mental Status: She is alert and oriented to person, place, and time.      Coordination: Coordination normal.      Gait: Gait normal.      Deep Tendon Reflexes: Reflexes are normal and symmetric.      Comments: CN grossly intact   Psychiatric:         Attention and Perception: Attention and perception normal. She is attentive.         Mood and Affect: Mood and affect normal.         Speech: Speech normal.         Behavior: Behavior normal. Behavior is cooperative.         Thought Content: Thought content normal.         Cognition and Memory: Cognition and memory normal.         Judgment: Judgment normal.         A/P    Diagnoses and all orders for this visit:    1. Physical exam, annual (Primary)  PE completed  Preventative labs ordered  Gynecologist - referral placed  Dentist - encouraged to go regularly  Ophthalmologist - encouraged to go regularly  Dermatologist - denies any moles or lesions of concern  Vaccinations discussed    2. Encounter to establish care  -     Ambulatory Referral to Obstetrics / Gynecology    3. Menstrual cramps  -     Ambulatory Referral to Obstetrics / Gynecology  Plans on starting family soon.  Will refer to obgyn.  Pap smear is up-to-date.    4. Anxiety  -     escitalopram (LEXAPRO) 10 MG tablet; Take 1 tablet by  mouth Daily.  Dispense: 90 tablet; Refill: 3  Stable on current medication.     Plan of care reviewed with patient at the conclusion of today's visit. Education was provided regarding nutrition , exercise, supplements, preventative screenings, and vaccinations diagnosis, management and any prescribed or recommended OTC medications.  Patient verbalizes understanding of and agreement with management plan.    Dictated Utilizing Dragon Dictation     Please note that portions of this note were completed with a voice recognition program.     Part of this note may be an electronic transcription/translation of spoken language to printed text using the Dragon Dictation System.    Return in about 1 year (around 4/2/2025) for Annual physical.     Calista Amin PA-C

## 2024-05-17 PROBLEM — Z01.419 WELL WOMAN EXAM: Status: ACTIVE | Noted: 2024-05-17

## 2024-05-20 ENCOUNTER — OFFICE VISIT (OUTPATIENT)
Dept: OBSTETRICS AND GYNECOLOGY | Facility: CLINIC | Age: 24
End: 2024-05-20
Payer: COMMERCIAL

## 2024-05-20 VITALS
SYSTOLIC BLOOD PRESSURE: 110 MMHG | HEIGHT: 62 IN | BODY MASS INDEX: 23.92 KG/M2 | DIASTOLIC BLOOD PRESSURE: 70 MMHG | WEIGHT: 130 LBS

## 2024-05-20 DIAGNOSIS — Z01.419 WELL WOMAN EXAM WITH ROUTINE GYNECOLOGICAL EXAM: Primary | ICD-10-CM

## 2024-05-20 DIAGNOSIS — N93.9 ABNORMAL UTERINE BLEEDING (AUB): ICD-10-CM

## 2024-05-20 DIAGNOSIS — N94.6 DYSMENORRHEA: ICD-10-CM

## 2024-05-20 DIAGNOSIS — N93.0 PCB (POST COITAL BLEEDING): ICD-10-CM

## 2024-05-20 PROCEDURE — 99385 PREV VISIT NEW AGE 18-39: CPT | Performed by: OBSTETRICS & GYNECOLOGY

## 2024-05-20 PROCEDURE — 99459 PELVIC EXAMINATION: CPT | Performed by: OBSTETRICS & GYNECOLOGY

## 2024-05-23 LAB — REF LAB TEST METHOD: NORMAL

## 2024-05-24 ENCOUNTER — PATIENT ROUNDING (BHMG ONLY) (OUTPATIENT)
Dept: OBSTETRICS AND GYNECOLOGY | Facility: CLINIC | Age: 24
End: 2024-05-24
Payer: COMMERCIAL

## 2024-05-24 NOTE — PROGRESS NOTES
A [employer] message has been sent to the patient for PATIENT ROUNDING with [facility]

## 2024-06-20 ENCOUNTER — OFFICE VISIT (OUTPATIENT)
Age: 24
End: 2024-06-20
Payer: COMMERCIAL

## 2024-06-20 VITALS
WEIGHT: 125.1 LBS | HEIGHT: 63 IN | BODY MASS INDEX: 22.16 KG/M2 | DIASTOLIC BLOOD PRESSURE: 70 MMHG | SYSTOLIC BLOOD PRESSURE: 102 MMHG

## 2024-06-20 DIAGNOSIS — S69.91XA FINGER INJURY, RIGHT, INITIAL ENCOUNTER: Primary | ICD-10-CM

## 2024-06-20 NOTE — PROGRESS NOTES
"                                                               [facility]     Office Visit       Date: 06/20/2024   Patient Name: Lorenza Mccloud  MRN: [id number]  YOB: 2000    Referring Physician: Sathya Echevarria PA-C     Chief Complaint:   Chief Complaint   Patient presents with    Right Hand - Initial Evaluation     Right ring finger injury DOI: 6/20/24       History of Present Illness:   Lorenza Mccloud is a 23 y.o. female right-hand-dominant presents the right ring finger pain of 1 day duration.  She reports cut her right ring finger in a door this morning.  Reports she noticed immediate pain and swelling.  Also had some bleeding under the nail.  She presents today with persistent pain.  She is otherwise healthy.  She denies smoking.      Subjective   Review of Systems:   Review of Systems   Constitutional: Negative.    HENT: Negative.     Eyes: Negative.    Respiratory: Negative.     Cardiovascular: Negative.    Gastrointestinal: Negative.    Endocrine: Negative.    Genitourinary: Negative.    Musculoskeletal:  Positive for arthralgias.   Skin: Negative.    Allergic/Immunologic: Negative.    Neurological: Negative.    Hematological: Negative.    Psychiatric/Behavioral: Negative.          Pertinent review of systems per HPI.     I reviewed the patient's chief complaint, history of present illness, review of systems, past medical history, surgical history, family history, social history, medications and allergy list in the EMR on 06/20/2024 and agree with the findings above.    Objective    Vital Signs:   Vitals:    06/20/24 1401   BP: 102/70   Weight: 56.7 kg (125 lb 1.6 oz)   Height: 160 cm (63\")     BMI: Body mass index is 22.16 kg/m².    General Appearance: No acute distress. Alert and oriented.     Chest:  Non-labored breathing on room air. Regular rate and rhythm.    Upper Extremity Exam:    Mild edema ecchymosis of the right ring finger.  There is a small " area of bleeding on the ulnar aspect of the right ring finger hyponychium.  There is no subungual hematoma.  There is no obvious nail deformity.    Fingers are warm, well-perfused with appropriate capillary refill.  Palpable radial pulse.    Sensation intact to light touch in median, radial and ulnar nerve distributions.    Motor- Fires [employer], ulnar intrinsics, EPL/EDC w/ full active and passive range of motion. Strength intact.    Non-tender except for in the areas highlighted    Imaging/Studies:   Imaging Results (Last 24 Hours)       Procedure Component Value Units Date/Time    XR Finger 2+ View Right [[id number]] Resulted: 06/20/24 1412     Updated: 06/20/24 1412    Narrative:      Right Ring Finger X-Ray    Indication: Pain    Views:  AP, Lateral, and Oblique     Comparison:  None    Findings:  No fracture  No bony lesion  Normal soft tissues  Normal joint spaces    Impression: No bony abnormality                Procedures:  Procedures    Quality Measures:   ACP:   ACP discussion was deferred.    Tobacco:   Lorenza Mccloud  reports that she has never smoked. She has never been exposed to tobacco smoke. She has never used smokeless tobacco.      Assessment / Plan    Assessment/Plan:     There are no diagnoses linked to this encounter.     Lorenza Mccloudis a 23 y.o. female who presents with:      ICD-10-CM ICD-9-CM   1. Finger injury, right, initial encounter  S69.91XA 959.5         Patient presents with right ring finger crush injury.  No evidence of fracture on x-ray and no evidence of nailbed injury on exam.  Recommend ice, elevation and gradual return to normal activity.  Follow-up as needed with any concerns    Follow Up:   Return if symptoms worsen or fail to improve.        Junito Jean Baptiste MD  [facility]

## 2025-03-31 ENCOUNTER — OFFICE VISIT (OUTPATIENT)
Dept: OBSTETRICS AND GYNECOLOGY | Facility: CLINIC | Age: 25
End: 2025-03-31
Payer: COMMERCIAL

## 2025-03-31 VITALS
HEIGHT: 63 IN | BODY MASS INDEX: 23.99 KG/M2 | DIASTOLIC BLOOD PRESSURE: 70 MMHG | WEIGHT: 135.4 LBS | SYSTOLIC BLOOD PRESSURE: 110 MMHG

## 2025-03-31 DIAGNOSIS — R10.2 CHRONIC PELVIC PAIN IN FEMALE: ICD-10-CM

## 2025-03-31 DIAGNOSIS — N94.6 DYSMENORRHEA: ICD-10-CM

## 2025-03-31 DIAGNOSIS — G89.29 CHRONIC PELVIC PAIN IN FEMALE: ICD-10-CM

## 2025-03-31 DIAGNOSIS — N83.201 CYST OF RIGHT OVARY: ICD-10-CM

## 2025-03-31 DIAGNOSIS — N97.9 FEMALE INFERTILITY: Primary | ICD-10-CM

## 2025-03-31 NOTE — PROGRESS NOTES
"Subjective   Chief Complaint   Patient presents with    Results     Pelvic ultrasound results.       Lorenza Mccloud is a 24 y.o. year old .  Patient's last menstrual period was 2025 (exact date).  She presents to be seen because of follow-up of female infertility dysmenorrhea and chronic pelvic pain.  Patient also had a pelvic ultrasound today that was originally ordered in May 2024 but was just now performed due to scheduling conflicts for her.  She reports she has been having unprotected intercourse for greater than 12 months and then timing intercourse for at least 5 months.  She reports her partner is 26 years old and he has never fathered any other children.  Her last menstrual cycle occurred between March 3 to . She overall has a monthly menstrual cycle.     OTHER THINGS SHE WANTS TO DISCUSS TODAY:  Nothing else    The following portions of the patient's history were reviewed and updated as appropriate:current medications and allergies    Social History    Tobacco Use      Smoking status: Never        Passive exposure: Never      Smokeless tobacco: Never    Review of Systems  Constitutional POS: nothing reported    NEG: anorexia or night sweats   Genitourinary POS: nothing reported    NEG: dysuria or hematuria   Gastointestinal POS: nothing reported    NEG: bloating, change in bowel habits, melena, or reflux symptoms   Integument POS: nothing reported    NEG: moles that are changing in size, shape, color or rashes   Breast POS: nothing reported    NEG: persistent breast lump, skin dimpling, or nipple discharge         Objective   /70   Ht 160 cm (63\")   Wt 61.4 kg (135 lb 6.4 oz)   LMP 2025 (Exact Date)   Breastfeeding No   BMI 23.99 kg/m²     General:  well developed; well nourished  no acute distress  mentation appropriate   Skin:  Not performed.   Thyroid: not examined   Lungs:  breathing is unlabored   Heart:  Not performed.   Breasts:  Not performed. "   Abdomen: Not performed.   Pelvis: Not performed.     Lab Review   No data reviewed    Imaging   Pelvic ultrasound images independantly reviewed - normal appearing left ovary, normal appearing uterus, right ovarian cyst consistent with possible endometrioma         Assessment   Female infertility  Chronic pelvic pain  Dysmenorrhea  Right ovarian cyst with ddx to include endometrioma      Plan   Reviewed ultrasound findings with patient and that the right ovarian cyst does have features consistent with endometrioma which based on her symptoms could be consistent with possible endometriosis. In regards to infertility recommend below listed labs and recommended she encourage her partner get a semen analysis. Will have staff provide number to Dr. Mckinnon's number. Reviewed if labs are normal will assess D21 progesterone level and if this is normal reviewed consideration of diagnostic laparoscopy to assess for possible endometriosis and assess right ovarian cyst if still present. Recommend follow up ultrasound in ~ 6 weeks in regards to cyst.   Recommend continuing prenatal vitamin.   The importance of keeping all planned follow-up and taking all medications as prescribed was emphasized.  Follow up in ~ 6 weeks with follow up pelvic ultrasound and visit to discuss and follow up above.     No orders of the defined types were placed in this encounter.       Orders Placed This Encounter   Procedures    US Non-ob Transvaginal     Standing Status:   Future     Expected Date:   4/30/2025     Expiration Date:   3/31/2026     Reason for Exam::   recheck ovarian cyst     Release to patient:   Routine Release [[id number]]    Follicle Stimulating Hormone     Standing Status:   Future     Expected Date:   4/5/2025     Expiration Date:   3/31/2026     Release to patient:   Routine Release [[id number]]    Prolactin     Standing Status:   Future     Expected Date:   4/5/2025     Expiration Date:   3/31/2026     Release to patient:    Routine Release [[id number]]    TSH Rfx On Abnormal To Free T4     Standing Status:   Future     Expected Date:   4/5/2025     Expiration Date:   3/31/2026     Release to patient:   Routine Release [[id number]]    Antimullerian Hormone (AMH)     Standing Status:   Future     Expected Date:   4/5/2025     Expiration Date:   6/30/2026     Release to patient:   Routine Release [[id number]]         This note was electronically signed.    Anahi Vickers MD  March 31, 2025

## 2025-06-09 DIAGNOSIS — F41.9 ANXIETY: ICD-10-CM

## 2025-06-09 RX ORDER — ESCITALOPRAM OXALATE 10 MG/1
10 TABLET ORAL DAILY
Qty: 90 TABLET | Refills: 3 | OUTPATIENT
Start: 2025-06-09

## 2025-06-09 NOTE — TELEPHONE ENCOUNTER
Rx Refill Note  Requested Prescriptions     Pending Prescriptions Disp Refills    escitalopram (LEXAPRO) 10 MG tablet [Pharmacy Med Name: ESCITALOPRAM 10 MG TABLET] 90 tablet 3     Sig: TAKE 1 TABLET BY MOUTH DAILY      Last office visit with prescribing clinician: 4/1/2024   Last telemedicine visit with prescribing clinician: Visit date not found   Next office visit with prescribing clinician: Visit date not found                         Would you like a call back once the refill request has been completed: [] Yes [] No    If the office needs to give you a call back, can they leave a voicemail: [] Yes [] No    Funmilayo Hays MA  06/09/25, 15:27 EDT

## 2025-07-16 DIAGNOSIS — F41.9 ANXIETY: ICD-10-CM

## 2025-07-16 RX ORDER — ESCITALOPRAM OXALATE 10 MG/1
10 TABLET ORAL DAILY
Qty: 90 TABLET | Refills: 1 | Status: SHIPPED | OUTPATIENT
Start: 2025-07-16

## 2025-07-16 NOTE — TELEPHONE ENCOUNTER
Rx Refill Note  Requested Prescriptions     Pending Prescriptions Disp Refills    escitalopram (LEXAPRO) 10 MG tablet 90 tablet 3     Sig: Take 1 tablet by mouth Daily.      Last office visit with prescribing clinician: 4/1/2024   Last telemedicine visit with prescribing clinician: Visit date not found   Next office visit with prescribing clinician: Visit date not found                         Would you like a call back once the refill request has been completed: [] Yes [] No    If the office needs to give you a call back, can they leave a voicemail: [] Yes [] No    China Carvalho MA  07/16/25, 12:24 EDT